# Patient Record
Sex: FEMALE | Race: WHITE | NOT HISPANIC OR LATINO
[De-identification: names, ages, dates, MRNs, and addresses within clinical notes are randomized per-mention and may not be internally consistent; named-entity substitution may affect disease eponyms.]

---

## 2018-01-17 ENCOUNTER — RESULT REVIEW (OUTPATIENT)
Age: 68
End: 2018-01-17

## 2019-02-14 ENCOUNTER — RESULT REVIEW (OUTPATIENT)
Age: 69
End: 2019-02-14

## 2020-09-30 DIAGNOSIS — Z91.09 OTHER ALLERGY STATUS, OTHER THAN TO DRUGS AND BIOLOGICAL SUBSTANCES: ICD-10-CM

## 2020-09-30 DIAGNOSIS — Z63.4 DISAPPEARANCE AND DEATH OF FAMILY MEMBER: ICD-10-CM

## 2020-09-30 RX ORDER — TRIAMTERENE AND HYDROCHLOROTHIAZIDE 25; 37.5 MG/1; MG/1
37.5-25 TABLET ORAL DAILY
Qty: 90 | Refills: 1 | Status: ACTIVE | COMMUNITY
Start: 2020-09-30

## 2020-09-30 RX ORDER — TRIAMCINOLONE ACETONIDE 55 UG/1
55 SPRAY, METERED NASAL DAILY
Refills: 0 | Status: ACTIVE | COMMUNITY
Start: 2020-09-30

## 2020-09-30 RX ORDER — ALBUTEROL SULFATE 108 UG/1
108 (90 BASE) AEROSOL, METERED RESPIRATORY (INHALATION)
Refills: 0 | Status: ACTIVE | COMMUNITY
Start: 2020-09-30

## 2020-09-30 SDOH — SOCIAL STABILITY - SOCIAL INSECURITY: DISSAPEARANCE AND DEATH OF FAMILY MEMBER: Z63.4

## 2020-10-01 ENCOUNTER — NON-APPOINTMENT (OUTPATIENT)
Age: 70
End: 2020-10-01

## 2020-10-01 ENCOUNTER — APPOINTMENT (OUTPATIENT)
Dept: CARDIOLOGY | Facility: CLINIC | Age: 70
End: 2020-10-01
Payer: MEDICARE

## 2020-10-01 VITALS
BODY MASS INDEX: 35.12 KG/M2 | HEART RATE: 62 BPM | WEIGHT: 186 LBS | DIASTOLIC BLOOD PRESSURE: 84 MMHG | HEIGHT: 61 IN | RESPIRATION RATE: 15 BRPM | SYSTOLIC BLOOD PRESSURE: 170 MMHG | OXYGEN SATURATION: 96 %

## 2020-10-01 PROCEDURE — 99204 OFFICE O/P NEW MOD 45 MIN: CPT

## 2020-10-01 PROCEDURE — 93000 ELECTROCARDIOGRAM COMPLETE: CPT

## 2020-10-01 NOTE — PHYSICAL EXAM
[General Appearance - Well Developed] : well developed [Normal Appearance] : normal appearance [Well Groomed] : well groomed [General Appearance - Well Nourished] : well nourished [No Deformities] : no deformities [General Appearance - In No Acute Distress] : no acute distress [Normal Conjunctiva] : the conjunctiva exhibited no abnormalities [Eyelids - No Xanthelasma] : the eyelids demonstrated no xanthelasmas [Normal Oral Mucosa] : normal oral mucosa [No Oral Pallor] : no oral pallor [No Oral Cyanosis] : no oral cyanosis [Normal Jugular Venous A Waves Present] : normal jugular venous A waves present [Normal Jugular Venous V Waves Present] : normal jugular venous V waves present [No Jugular Venous Ayala A Waves] : no jugular venous ayala A waves [Respiration, Rhythm And Depth] : normal respiratory rhythm and effort [Exaggerated Use Of Accessory Muscles For Inspiration] : no accessory muscle use [Auscultation Breath Sounds / Voice Sounds] : lungs were clear to auscultation bilaterally [Abdomen Soft] : soft [Abdomen Tenderness] : non-tender [Abdomen Mass (___ Cm)] : no abdominal mass palpated [Abnormal Walk] : normal gait [Gait - Sufficient For Exercise Testing] : the gait was sufficient for exercise testing [Nail Clubbing] : no clubbing of the fingernails [Cyanosis, Localized] : no localized cyanosis [Petechial Hemorrhages (___cm)] : no petechial hemorrhages [Skin Color & Pigmentation] : normal skin color and pigmentation [] : no rash [No Venous Stasis] : no venous stasis [Skin Lesions] : no skin lesions [No Skin Ulcers] : no skin ulcer [No Xanthoma] : no  xanthoma was observed [Oriented To Time, Place, And Person] : oriented to person, place, and time [Affect] : the affect was normal [Mood] : the mood was normal [No Anxiety] : not feeling anxious [Normal] : normal [Normal Rate] : normal [Rhythm Regular] : regular [Normal S1] : normal S1 [Normal S2] : normal S2 [No Gallop] : no gallop heard [No Murmur] : no murmurs heard [2+] : left 2+ [1+] : left 1+ [No Pitting Edema] : no pitting edema present [FreeTextEntry1] : obese [S3] : no S3 [S4] : no S4 [Right Carotid Bruit] : no bruit heard over the right carotid [Left Carotid Bruit] : no bruit heard over the left carotid [Right Femoral Bruit] : no bruit heard over the right femoral artery [Left Femoral Bruit] : no bruit heard over the left femoral artery [Bruit] : no bruit heard

## 2020-10-01 NOTE — DISCUSSION/SUMMARY
[FreeTextEntry1] : Her symptoms overall seem unlikely to represent a manifestation of ischemia. I think her dyspnea reflects anxiety, and her symptoms of reflux are probably gastrointestinal.\par \par As a precaution she will schedule an echocardiogram and a stress test.\par \par Her blood pressure does not improve by the conclusion of the examination. If her blood pressures are elevated during the course of her examinations in the office, we will need to be more aggressive in determining her overall blood pressure control. I discussed the possibility of checking her blood pressure at home, or ambulatory monitoring.

## 2020-10-22 ENCOUNTER — APPOINTMENT (OUTPATIENT)
Dept: CARDIOLOGY | Facility: CLINIC | Age: 70
End: 2020-10-22
Payer: MEDICARE

## 2020-10-22 ENCOUNTER — TRANSCRIPTION ENCOUNTER (OUTPATIENT)
Age: 70
End: 2020-10-22

## 2020-10-22 PROCEDURE — 99072 ADDL SUPL MATRL&STAF TM PHE: CPT

## 2020-10-22 PROCEDURE — 93015 CV STRESS TEST SUPVJ I&R: CPT

## 2020-10-22 PROCEDURE — 93306 TTE W/DOPPLER COMPLETE: CPT

## 2022-04-21 ENCOUNTER — RESULT REVIEW (OUTPATIENT)
Age: 72
End: 2022-04-21

## 2023-05-18 NOTE — HISTORY OF PRESENT ILLNESS
71F with sacral wound due to being bed bound for years. She was debrided at OSH by general surgery on 5/16. Wound care is following. General surgery consulted due recent surgical debridement, WBC and drainage.     On exam she stable. Her sacral wound looks largely health and no foul odor, Small patches of grey. No palpable fluid collection. Some small areas of grey sluff. SS drainage. No significant bleeding.     -We do not recommend any surgical intervention at this time  -WBC likely multifactorial given UTI  -Continue to follow wound recommendations per wound care   -Continue abx  -Please re-consult general surgery if assistance is needed.   -Discussed with staff     Naif White MD  Surgery, PGY-2     [FreeTextEntry1] : Claire Padilla presented to the office today for cardiovascular evaluation. I saw her in 2014 for palpitations.\par \par She is now 70 years old, with a history of obesity, hypertension and hypercholesterolemia. She has a history of hypothyroidism.  She is not known to have any underlying structural heart disease. She apparently has a history of reactive hypertension, with blood pressures in the 170 range in the doctor's office, at least in the past.\par \par I saw her several years ago for palpitations. An echocardiogram and Holter monitor were generally reassuring.\par \par She presents to the office today with symptoms about 2 weeks ago. She describes a couple of hours of reflux type symptoms, treated successfully with over-the-counter antacids. She saw her primary care physician the next day, will prescribe a proton pump inhibitor, which she never took. Her symptoms have not returned. Instead, that night she began to experience a degree of shortness of breath, with some chills. She describes an inability to take a full deep breath, which has been occurring frequently over the past 2 weeks.\par \par She has remained relatively physically active, with walking, and yoga. With those activities she does not experience symptoms suggestive of angina, heart failure or arrhythmia.